# Patient Record
Sex: FEMALE | Race: WHITE
[De-identification: names, ages, dates, MRNs, and addresses within clinical notes are randomized per-mention and may not be internally consistent; named-entity substitution may affect disease eponyms.]

---

## 2019-06-25 ENCOUNTER — HOSPITAL ENCOUNTER (EMERGENCY)
Dept: HOSPITAL 8 - ED | Age: 74
LOS: 1 days | Discharge: HOME | End: 2019-06-26
Payer: COMMERCIAL

## 2019-06-25 VITALS — WEIGHT: 198.42 LBS | BODY MASS INDEX: 31.89 KG/M2 | HEIGHT: 66 IN

## 2019-06-25 DIAGNOSIS — Z88.2: ICD-10-CM
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
## 2019-06-25 DIAGNOSIS — S02.2XXB: Primary | ICD-10-CM
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
## 2019-06-25 DIAGNOSIS — Z88.8: ICD-10-CM
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
## 2019-06-25 PROCEDURE — 93005 ELECTROCARDIOGRAM TRACING: CPT

## 2019-06-25 PROCEDURE — 99284 EMERGENCY DEPT VISIT MOD MDM: CPT

## 2019-06-25 PROCEDURE — 12051 INTMD RPR FACE/MM 2.5 CM/<: CPT

## 2019-06-25 PROCEDURE — 70486 CT MAXILLOFACIAL W/O DYE: CPT

## 2019-06-25 NOTE — NUR
BIB [facility] W/ CO EPISTAXIS AND MOUTH PAIN SP MECHANICAL GLF. PT REPORTS 
DIZZINESS AFTER FALL BUT HAS SINCE RESOLVED. LACERATION NOTED TO NASAL BRIDGE, 
R NARE EPISTAXIS. PT DENIES LOC/N/V/MIDLINE NECK OR BACK PAIN/HA. 

NOT ON BLOOD THINNERS. FSBS  



TETANUS UTD



BP/SPO2/ECG MONITORING IN PLACE. NSR ON MONITOR. EKG COMPLETED UPON ARRIVAL.

## 2019-06-26 VITALS — SYSTOLIC BLOOD PRESSURE: 148 MMHG | DIASTOLIC BLOOD PRESSURE: 60 MMHG

## 2019-06-26 NOTE — NUR
PT SITTING UP IN [place]. BLEEDING APPEARS CONTROLED. PT REPORTS IMPROVEMENT IN 
PAIN WITH MEDICATIONS, 6/10. CHART UP FOR RECHECK. AWAITING FURTHER ORDERS.

## 2019-06-26 NOTE — NUR
RECEIVED REPORT FROM GINA NARAYAN TO ASSUME CARE OF PT.  DR. SANCHEZ AT  FOR 
[facility].

## 2019-06-26 NOTE — NUR
NOSE IRRIGATED PER VERBAL REQUEST FROM LOUISE LOBATO.  PT. TO HAVE SUTURES PLACED.  
PT. TOLERATED WELL.